# Patient Record
Sex: MALE | Race: WHITE | Employment: FULL TIME | URBAN - METROPOLITAN AREA
[De-identification: names, ages, dates, MRNs, and addresses within clinical notes are randomized per-mention and may not be internally consistent; named-entity substitution may affect disease eponyms.]

---

## 2019-12-14 ENCOUNTER — OFFICE VISIT (OUTPATIENT)
Dept: URGENT CARE | Facility: URGENT CARE | Age: 49
End: 2019-12-14
Payer: COMMERCIAL

## 2019-12-14 VITALS
HEART RATE: 90 BPM | TEMPERATURE: 99 F | OXYGEN SATURATION: 98 % | SYSTOLIC BLOOD PRESSURE: 130 MMHG | DIASTOLIC BLOOD PRESSURE: 80 MMHG

## 2019-12-14 DIAGNOSIS — R50.9 FEBRILE ILLNESS: Primary | ICD-10-CM

## 2019-12-14 DIAGNOSIS — R10.31 ABDOMINAL PAIN, RIGHT LOWER QUADRANT: ICD-10-CM

## 2019-12-14 LAB
FLUAV+FLUBV AG SPEC QL: NEGATIVE
FLUAV+FLUBV AG SPEC QL: NEGATIVE
SPECIMEN SOURCE: NORMAL

## 2019-12-14 PROCEDURE — 87804 INFLUENZA ASSAY W/OPTIC: CPT | Performed by: FAMILY MEDICINE

## 2019-12-14 PROCEDURE — 99203 OFFICE O/P NEW LOW 30 MIN: CPT | Performed by: FAMILY MEDICINE

## 2019-12-14 SDOH — HEALTH STABILITY: MENTAL HEALTH: HOW OFTEN DO YOU HAVE A DRINK CONTAINING ALCOHOL?: 4 OR MORE TIMES A WEEK

## 2019-12-14 SDOH — HEALTH STABILITY: MENTAL HEALTH: HOW MANY STANDARD DRINKS CONTAINING ALCOHOL DO YOU HAVE ON A TYPICAL DAY?: 3 OR 4

## 2019-12-14 NOTE — PATIENT INSTRUCTIONS
right lower quadrant pain very tender and fever  Flu test negative  Recommend ASAP ER evaluation - rule out appendicitis

## 2019-12-14 NOTE — LETTER
[facility]  [address] [facility] [address]  Phone: [phone]    December 14, 2019        Jah Celestin  [address] [place] [address]          To whom it may concern:    RE: Jah Celestin    Patient was seen and treated today at our clinic.  Recommend stay home today and tomorrow.  Patient sent to the Emergency Room for additional evaluation today.     Please contact me for questions or concerns.      Sincerely,        Tsering Michaels MD

## 2019-12-14 NOTE — PROGRESS NOTES
"Chief complaint: abdominal pain    New patient    CAGE negative - patient discussed with primary care provider and refused to discuss further at this time    Feels safe at home  No thoughts of harming self or others     Last week ate some food at an event  [place] off since then lack of energy tired and mild headache   Yesterday patient went to work felt really tired   Felt nauseous and no appetite  Felt \"constipated\" felt like he needed to go but couldn't  Patient took 3 dulcolax and seemed to help the constipated feeling  But then that night started shivering and then the abdominal cramping came back   Fever 102 last night   Took nyquil today as well temp 99.8    No sore throat  No cough  No runny nose  No ear pain    No Known Allergies    History reviewed. No pertinent past medical history.    No current outpatient medications on file prior to visit.  No current facility-administered medications on file prior to visit.       Social History     Tobacco Use     Smoking status: Never Smoker     Smokeless tobacco: Current User     Types: Chew   Substance Use Topics     Alcohol use: Yes     Frequency: 4 or more times a week     Drinks per session: 3 or 4     Drug use: Never       ROS:  review of systems negative except for noted above.   No thoughts of harming self or others.     OBJECTIVE:  /80   Pulse 90   Temp 99  F (37.2  C)   SpO2 98%    General:   awake, alert, and cooperative.  NAD.   Head: Normocephalic, atraumatic.  Eyes: Conjunctiva clear,   Heart: Regular rate and rhythm. No murmur.  Lungs: Chest is clear; no wheezes or rales.   Abdomen: exquisitely tender right lower quadrant positive rebound and guarding   Neuro: Alert and oriented - normal speech.  MS: Using extremities freely  PSYCH:  Normal affect, normal speech  SKIN: no obvious rashes    Flu negative     ASSESSMENT:    ICD-10-CM    1. Febrile illness R50.9 Influenza A/B antigen   2. Abdominal pain, right lower quadrant R10.31        PLAN: "   right lower quadrant pain very tender and fever  Flu test negative  Recommend ASAP ER evaluation - rule out appendicitis   Patient declined ambulance, risks of transfer discussed  AVS printed and given to patient     Tsering Michaels MD